# Patient Record
Sex: MALE
[De-identification: names, ages, dates, MRNs, and addresses within clinical notes are randomized per-mention and may not be internally consistent; named-entity substitution may affect disease eponyms.]

---

## 2017-05-03 PROBLEM — Z00.00 ENCOUNTER FOR PREVENTIVE HEALTH EXAMINATION: Status: ACTIVE | Noted: 2017-05-03

## 2020-12-22 ENCOUNTER — APPOINTMENT (OUTPATIENT)
Dept: OTOLARYNGOLOGY | Facility: CLINIC | Age: 65
End: 2020-12-22

## 2021-01-25 ENCOUNTER — APPOINTMENT (OUTPATIENT)
Dept: NEUROLOGY | Facility: CLINIC | Age: 66
End: 2021-01-25
Payer: COMMERCIAL

## 2021-01-25 VITALS — TEMPERATURE: 95.9 F

## 2021-01-25 VITALS
BODY MASS INDEX: 29.62 KG/M2 | WEIGHT: 200 LBS | DIASTOLIC BLOOD PRESSURE: 83 MMHG | HEIGHT: 69 IN | HEART RATE: 84 BPM | SYSTOLIC BLOOD PRESSURE: 120 MMHG

## 2021-01-25 DIAGNOSIS — H81.10 BENIGN PAROXYSMAL VERTIGO, UNSPECIFIED EAR: ICD-10-CM

## 2021-01-25 DIAGNOSIS — R20.0 ANESTHESIA OF SKIN: ICD-10-CM

## 2021-01-25 DIAGNOSIS — Z78.9 OTHER SPECIFIED HEALTH STATUS: ICD-10-CM

## 2021-01-25 DIAGNOSIS — Z87.891 PERSONAL HISTORY OF NICOTINE DEPENDENCE: ICD-10-CM

## 2021-01-25 DIAGNOSIS — Z56.0 UNEMPLOYMENT, UNSPECIFIED: ICD-10-CM

## 2021-01-25 DIAGNOSIS — H93.11 TINNITUS, RIGHT EAR: ICD-10-CM

## 2021-01-25 DIAGNOSIS — H91.93 UNSPECIFIED HEARING LOSS, BILATERAL: ICD-10-CM

## 2021-01-25 PROCEDURE — 99203 OFFICE O/P NEW LOW 30 MIN: CPT

## 2021-01-25 PROCEDURE — 99072 ADDL SUPL MATRL&STAF TM PHE: CPT

## 2021-01-25 RX ORDER — ATORVASTATIN CALCIUM 80 MG/1
TABLET, FILM COATED ORAL
Refills: 0 | Status: ACTIVE | COMMUNITY

## 2021-01-25 RX ORDER — METFORMIN HYDROCHLORIDE 625 MG/1
TABLET ORAL
Refills: 0 | Status: ACTIVE | COMMUNITY

## 2021-01-25 RX ORDER — IRBESARTAN 300 MG/1
TABLET ORAL
Refills: 0 | Status: ACTIVE | COMMUNITY

## 2021-01-25 RX ORDER — HYDROCHLOROTHIAZIDE 12.5 MG/1
TABLET ORAL
Refills: 0 | Status: ACTIVE | COMMUNITY

## 2021-01-25 SDOH — ECONOMIC STABILITY - INCOME SECURITY: UNEMPLOYMENT, UNSPECIFIED: Z56.0

## 2021-01-25 NOTE — CONSULT LETTER
[Dear  ___] : Dear  [unfilled], [Consult Letter:] : I had the pleasure of evaluating your patient, [unfilled]. [Please see my note below.] : Please see my note below. [Consult Closing:] : Thank you very much for allowing me to participate in the care of this patient.  If you have any questions, please do not hesitate to contact me. [Sincerely,] : Sincerely, [FreeTextEntry3] : Naseem Holt MD\par

## 2021-01-25 NOTE — REVIEW OF SYSTEMS
[Hand Weakness] :  hand weakness [Numbness] : numbness [Tingling] : tingling [Difficulty Walking] : difficulty walking [Loss Of Hearing] : hearing loss [Negative] : Heme/Lymph

## 2021-01-25 NOTE — HISTORY OF PRESENT ILLNESS
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  He describes episodic imbalance for 15 or 20 years.  In December 2020, he experienced positionally aggravated vertigo which subsided after a week.  He was evaluated in your office and was diagnosed with BPPV.  He has bilateral hearing loss and is being fitted for hearing aids.  He complains of right-sided tinnitus.  His most recent MRI of the brain was performed 15 years ago.\par \par His wife is concerned that he might suffer from Parkinson's disease.  For less than a year, he coughs when eating solids.  He denies dysarthria.\par \par He has been awakening from sleep over the past 6 months with numbness of his right more than left fingers.  All fingers are involved.  He complains that he is dropping objects.  He denies neck or back pain.\par

## 2021-01-25 NOTE — PHYSICAL EXAM
[FreeTextEntry1] : Constitutional:  Patient was well-developed, well-nourished and in no acute distress. \par \par Head:  Normocephalic, atraumatic. Tympanic membranes were clear. \par \par Neck:  Supple with full range of motion. \par \par Cardiovascular:  Cardiac rhythm was regular without murmur. There were no carotid bruits. Peripheral pulses were full and symmetric. \par \par Respiratory:  Lungs were clear. \par \par Abdomen:  Soft and nontender. \par \par Spine:  Nontender. \par \par Skin:  There were no rashes. \par \par NEUROLOGICAL EXAMINATION:\par \par Mental Status: Patient was alert and oriented. Speech was fluent. There was no dysarthria. \par \par Cranial Nerves: \par \par II: Visual acuity was 20/70-1 in the right eye and 20/25 in the left eye without correction . Pupils were equal and reactive. Visual fields were full. Funduscopic examination was normal. \par \par III, IV, VI:  Eye movements were full without nystagmus. \par \par V: Facial sensation was intact. \par \par VII: Facial strength was normal. \par \par VIII: Hearing was equal. \par \par IX, X: Palatal movement was normal. Phonation was normal. \par \par XI: Sternocleidomastoids and trapezii were normal. \par \par XII: Tongue was midline and movements normal. There was no lingual atrophy or fasciculations. \par \par Motor Examination: Muscle bulk, tone and strength were normal. \par \par Sensory Examination: Pinprick, vibration and joint position sense were intact.  There were no Tinel signs at the wrists or elbows.\par \par Reflexes: DTRs were 1 at the biceps and knees and absent elsewhere.\par \par Plantar Responses: Plantar responses were flexor. \par \par Coordination/Cerebellar Function: There was no dysmetria on finger to nose or heel to shin testing. \par \par Gait/Stance: Gait and tandem were normal. Romberg was negative.\par

## 2021-01-25 NOTE — ASSESSMENT
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  I suspect that these symptoms are the consequence of vestibular and cochlear dysfunction.  In addition, he has mild vague swallowing difficulties.  I suspect that his nocturnal hand symptoms are due to entrapment neuropathies, probably carpal tunnel syndrome.  He does not appear clinically parkinsonian.\par \par I suggested that he undergo an MRI of the brain and internal auditory canals with and without contrast.  He will return to the office for EMG and nerve conduction studies.  Further management will depend upon these results and his clinical course.

## 2021-02-11 ENCOUNTER — APPOINTMENT (OUTPATIENT)
Dept: NEUROLOGY | Facility: CLINIC | Age: 66
End: 2021-02-11
Payer: COMMERCIAL

## 2021-02-11 VITALS — TEMPERATURE: 97.3 F

## 2021-02-11 PROCEDURE — 99072 ADDL SUPL MATRL&STAF TM PHE: CPT

## 2021-02-11 PROCEDURE — 95886 MUSC TEST DONE W/N TEST COMP: CPT

## 2021-02-11 PROCEDURE — 95911 NRV CNDJ TEST 9-10 STUDIES: CPT

## 2021-02-11 NOTE — PROCEDURE
[FreeTextEntry1] : Nerve conduction studies and electromyography [FreeTextEntry2] : Right greater than left hand numbness and discomfort [FreeTextEntry3] : Electrodiagnostic testing was performed in the upper extremities.\par \par The median sensory nerves were nonreactive.  The ulnar sensory potentials were mildly low in amplitude but conduction velocities were normal.  Radial sensory potentials and conduction velocities were normal.\par \par The median motor distal latencies were prolonged, right worse than left but compound muscle action potentials were normal.  Right median conduction velocity was mildly slow and left was normal.  The ulnar motor distal latencies and compound muscle action potentials recorded at the abductor digiti minimi were normal.  There was slowing of ulnar conduction across the elbow segments.\par \par The median F waves were prolonged, right worse than left.  The ulnar F waves were normal.\par \par Needle electromyography was performed in several bilateral upper extremity and cervical paraspinal muscles.  There was no spontaneous activity noted in any muscle tested.  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  The median motor injuries were entirely demyelinating.\par \par There was electrophysiologic evidence of bilateral ulnar neuropathies in the cubital tunnels.  There was mild ulnar sensory axon injury bilaterally but no evidence of ulnar motor axon injury.\par \par There was no electrophysiologic evidence of right or left cervical radiculopathy, brachial plexopathy or sensorimotor polyneuropathy.

## 2021-02-22 ENCOUNTER — NON-APPOINTMENT (OUTPATIENT)
Age: 66
End: 2021-02-22

## 2021-03-17 ENCOUNTER — APPOINTMENT (OUTPATIENT)
Dept: NEUROLOGY | Facility: CLINIC | Age: 66
End: 2021-03-17

## 2021-06-14 ENCOUNTER — APPOINTMENT (OUTPATIENT)
Dept: NEUROLOGY | Facility: CLINIC | Age: 66
End: 2021-06-14
Payer: COMMERCIAL

## 2021-06-14 VITALS
WEIGHT: 200 LBS | HEIGHT: 69 IN | DIASTOLIC BLOOD PRESSURE: 74 MMHG | HEART RATE: 91 BPM | SYSTOLIC BLOOD PRESSURE: 109 MMHG | BODY MASS INDEX: 29.62 KG/M2

## 2021-06-14 DIAGNOSIS — G56.22 LESION OF ULNAR NERVE, LEFT UPPER LIMB: ICD-10-CM

## 2021-06-14 DIAGNOSIS — G56.12 OTHER LESIONS OF MEDIAN NERVE, LEFT UPPER LIMB: ICD-10-CM

## 2021-06-14 DIAGNOSIS — G56.21 LESION OF ULNAR NERVE, RIGHT UPPER LIMB: ICD-10-CM

## 2021-06-14 DIAGNOSIS — G56.11 OTHER LESIONS OF MEDIAN NERVE, RIGHT UPPER LIMB: ICD-10-CM

## 2021-06-14 PROCEDURE — 99213 OFFICE O/P EST LOW 20 MIN: CPT

## 2021-06-14 PROCEDURE — 99072 ADDL SUPL MATRL&STAF TM PHE: CPT

## 2021-06-14 RX ORDER — ALFUZOSIN HYDROCHLORIDE 10 MG/1
10 TABLET, EXTENDED RELEASE ORAL
Qty: 30 | Refills: 0 | Status: ACTIVE | COMMUNITY
Start: 2021-03-30

## 2021-06-14 RX ORDER — MELOXICAM 15 MG/1
15 TABLET ORAL
Qty: 30 | Refills: 0 | Status: ACTIVE | COMMUNITY
Start: 2021-03-02

## 2021-06-14 RX ORDER — METFORMIN ER 500 MG 500 MG/1
500 TABLET ORAL
Qty: 30 | Refills: 0 | Status: ACTIVE | COMMUNITY
Start: 2021-03-30

## 2021-06-14 RX ORDER — IRBESARTAN AND HYDROCHLOROTHIAZIDE 150; 12.5 MG/1; MG/1
150-12.5 TABLET ORAL
Qty: 30 | Refills: 0 | Status: ACTIVE | COMMUNITY
Start: 2021-03-30

## 2021-06-14 NOTE — HISTORY OF PRESENT ILLNESS
[FreeTextEntry1] : Dr. Holedn Monroy returned to the office having been last seen on February 11, 2021.  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  \par \par MRI brain revealed moderate chronic white matter changes.  Electrodiagnostic testing revealed median and ulnar neuropathies at the wrist and elbows respectively.  There was no evidence of cervical radiculopathy, brachial plexopathy or sensorimotor polyneuropathy.\par \par He was evaluated by Dr. Park Burnett, a hand surgeon.  She prescribed wrist extension splints and physical therapy.  Steroids were injected into both wrists.  He has noted mild improvement in his left wrist symptoms but not the right.  He denies any further positional vertigo but feels mildly imbalanced.

## 2021-06-14 NOTE — ASSESSMENT
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  MRI of the brain revealed chronic white matter changes.  I suggested close control of his cardiovascular risk factors.  A screening carotid Doppler will be ordered.  \par \par A musculoskeletal ultrasound of his upper extremities will be requested to assess his entrapment neuropathies.  He will continue use of wrist extension splints and nonsteroidal anti-inflammatory agents.  Further management will depend upon these results and his clinical course.

## 2021-06-14 NOTE — PHYSICAL EXAM
[FreeTextEntry1] : Constitutional:  Patient was well-developed, well-nourished and in no acute distress. \par \par Head:  Normocephalic, atraumatic. Tympanic membranes were clear. \par \par Neck:  Supple with full range of motion. \par \par Cardiovascular:  Cardiac rhythm was regular without murmur. There were no carotid bruits. Peripheral pulses were full and symmetric. \par \par Respiratory:  Lungs were clear. \par \par Abdomen:  Soft and nontender. \par \par Spine:  Nontender. \par \par Skin:  There were no rashes. \par \par NEUROLOGICAL EXAMINATION:\par \par Mental Status: Patient was alert and oriented. Speech was fluent. There was no dysarthria. \par \par Cranial Nerves: \par \par II: Visual acuity was 20/70 in the right eye and 20/20 in the left eye without correction . Pupils were equal and reactive. Visual fields were full. Funduscopic examination was normal. \par \par III, IV, VI:  Eye movements were full without nystagmus. \par \par V: Facial sensation was intact. \par \par VII: Facial strength was normal. \par \par VIII: Hearing was equal. \par \par IX, X: Palatal movement was normal. Phonation was normal. \par \par XI: Sternocleidomastoids and trapezii were normal. \par \par XII: Tongue was midline and movements normal. There was no lingual atrophy or fasciculations. \par \par Motor Examination: Muscle bulk, tone and strength were normal except for headache of right thumb abduction weakness.\par \par Sensory Examination: Pinprick, vibration and joint position sense were intact.  There were no Tinel signs at the wrists or elbows.\par \par Reflexes: DTRs were 1 at the biceps and knees and absent elsewhere.\par \par Plantar Responses: Plantar responses were flexor. \par \par Coordination/Cerebellar Function: There was no dysmetria on finger to nose or heel to shin testing. \par \par Gait/Stance: Gait was normal. Romberg was negative.  Tandem was mildly unsteady.\par

## 2021-06-14 NOTE — CONSULT LETTER
[Dear  ___] : Dear  [unfilled], [Consult Letter:] : I had the pleasure of evaluating your patient, [unfilled]. [Please see my note below.] : Please see my note below. [Consult Closing:] : Thank you very much for allowing me to participate in the care of this patient.  If you have any questions, please do not hesitate to contact me. [Sincerely,] : Sincerely, [FreeTextEntry3] : Naseem Holt MD\par  [DrMunir  ___] : Dr. COPELAND

## 2021-10-25 ENCOUNTER — APPOINTMENT (OUTPATIENT)
Dept: NEUROLOGY | Facility: CLINIC | Age: 66
End: 2021-10-25

## 2022-10-25 ENCOUNTER — TRANSCRIPTION ENCOUNTER (OUTPATIENT)
Age: 67
End: 2022-10-25

## 2023-08-03 ENCOUNTER — NON-APPOINTMENT (OUTPATIENT)
Age: 68
End: 2023-08-03

## 2024-02-09 ENCOUNTER — APPOINTMENT (OUTPATIENT)
Dept: ENDOCRINOLOGY | Facility: CLINIC | Age: 69
End: 2024-02-09
Payer: COMMERCIAL

## 2024-02-09 VITALS
OXYGEN SATURATION: 97 % | WEIGHT: 195 LBS | DIASTOLIC BLOOD PRESSURE: 64 MMHG | BODY MASS INDEX: 28.8 KG/M2 | SYSTOLIC BLOOD PRESSURE: 110 MMHG | HEART RATE: 77 BPM

## 2024-02-09 DIAGNOSIS — E78.5 HYPERLIPIDEMIA, UNSPECIFIED: ICD-10-CM

## 2024-02-09 DIAGNOSIS — I10 ESSENTIAL (PRIMARY) HYPERTENSION: ICD-10-CM

## 2024-02-09 DIAGNOSIS — E11.9 TYPE 2 DIABETES MELLITUS W/OUT COMPLICATIONS: ICD-10-CM

## 2024-02-09 LAB — GLUCOSE BLDC GLUCOMTR-MCNC: 118

## 2024-02-09 PROCEDURE — 82962 GLUCOSE BLOOD TEST: CPT

## 2024-02-09 PROCEDURE — 99205 OFFICE O/P NEW HI 60 MIN: CPT

## 2024-02-09 RX ORDER — SITAGLIPTIN AND METFORMIN HYDROCHLORIDE 100; 1000 MG/1; MG/1
100-1000 TABLET, FILM COATED, EXTENDED RELEASE ORAL DAILY
Qty: 1 | Refills: 2 | Status: ACTIVE | COMMUNITY
Start: 2024-02-09 | End: 1900-01-01

## 2024-02-09 NOTE — ASSESSMENT
[Diabetes Foot Care] : diabetes foot care [Long Term Vascular Complications] : long term vascular complications of diabetes [Carbohydrate Consistent Diet] : carbohydrate consistent diet [Importance of Diet and Exercise] : importance of diet and exercise to improve glycemic control, achieve weight loss and improve cardiovascular health [Exercise/Effect on Glucose] : exercise/effect on glucose [Hypoglycemia Management] : hypoglycemia management [Glucagon Use] : glucagon use [Self Monitoring of Blood Glucose] : self monitoring of blood glucose [Retinopathy Screening] : Patient was referred to ophthalmology for retinopathy screening [Diabetic Medications] : Risks and benefits of diabetic medications were discussed [FreeTextEntry1] : T2DM Current approaches to a diabetes management are discussed with the patient.  Target ranges for blood sugar, blood pressure and cholesterol reviewed, and risk reduction strategies verified. Hypoglycemia precautions reviewed with the patient.  Suggested extensive diabetes education program, including nutritional and diabetes teaching and evaluation. Proper dietary restrictions and exercise routines discussed.  Glucometer/SMBG and log book charting discussed. Routine follow-ups with a podiatrist and an ophthalmologist. - d/c metformin - Janumet /1000 mg qd - Lipitor 20 mg HS - Avapro/-12.5 mg qd - start CGM (patient will inquire about coverage) - if needs better control/ weight loss- will switch to [id number] next visit - urology and ID follow up  RTC 3 months

## 2024-02-09 NOTE — REVIEW OF SYSTEMS
[TextEntry] : Constitutional: Denies excessive fatigue. Denies significant weight changes  Skin: Denies diaphoresis or sweating  HEENT: Denies vision changes, eye protrusion/dryness Endocrine: No cold intolerance. No heat intolerance  CV: Denies chest pain, palpitations or sensation of heart racing  Pulmonary: No SOB GI: Denies diarrhea or constipation, nausea/ vomiting  Neurological: Denies numbness, paresthesias, tremor  Neuropsychiatric: Denies anxiety  Musculoskeletal: Denies new joint pain, muscle aches or proximal muscle weakness. No intermittent claudication

## 2024-02-09 NOTE — CONSULT LETTER
[Dear  ___] : Dear  [unfilled], [Sincerely,] : Sincerely, [FreeTextEntry1] : Thank you for referring  Mr. JG MCCARHTY to me for evaluation and treatment. Please, see attached consultation note. As always, if there are specific questions you would like to discuss, please feel free to contact me. Thank you for the courtesy of this evaluation. [FreeTextEntry3] : Liam Abad MD, FACE, [facility]

## 2024-02-09 NOTE — HISTORY OF PRESENT ILLNESS
[FreeTextEntry1] : HISTORY OF PRESENT ILLNESS.   Mr. MCCARTHY was diagnosed with Diabetes Mellitus Type 2 about 10 years ago. He reports history HTN, dyslipidemia, and denies CAD,  known complications of retinopathy, nephropathy, or neuropathy. He is presently on Metformin  mg qd, Avapro-HVCT- 150-12.5 mg qd, Lipitor 20 mg HS. He reports recent MRSA UTI's, for which he's been treated with Bactrim. He's planning to see a urologist. Blood sugars are not checked at home.  Fingerstick glucose in the office today is  118 mg/dL 2 hours after eating.  Diet: not following ADA, but cut down on CHO past several weeks, and was able to lose about 10 lbs Exercise: past 2 weeks increased activity  Lab review: a1c- 6.6 <-- 6.4 <-- 6.1, creatinine- 0.87, LDL- 74, TSH- 0.8, negative microalbumin  Last dilated eye - several years ago Last podiatry visit  - 01/24 Last cardiology evaluation - 08/23 Last stress test - 08/23 Last 2-D Echo - 08/23 Last nephrology evaluation - none Last neurology evaluation-6/23

## 2024-02-09 NOTE — PHYSICAL EXAM
[TextEntry] : General: In no acute distress. Head: Normocephalic Skin: Normal, no bruises. There are no cushingoid features. Eyes: No pallor, lid lag or orbitopathy. Neck: No audible carotid bruit Thyroid: palpable, non-tender. No audible bruit. Not enlarged. No palpable nodules. [place]'s negative Lymph nodes: no palpable neck lymphadenopathy. Chest: Lungs clear to auscultation Heart: S1, S2, no murmurs Abdomen: No tenderness, no masses, no striae Neurological: Deep tendon reflexes symmetrical, 2+ (biceps, brachioradialis, patellar). Lower Extremities: No edema, no cyanosis Foot exam: No ulcers, no onychomycotic nail changes. Good pedal pulses. 10-g [place] M monofilament testing is normal bilaterally. Vibratory sensation with 128-Hz tuning fork is preserved bilaterally.

## 2024-02-11 PROBLEM — E78.5 HYPERLIPIDEMIA: Status: ACTIVE | Noted: 2021-01-25

## 2024-02-11 PROBLEM — I10 HYPERTENSION: Status: ACTIVE | Noted: 2021-01-25

## 2024-02-12 RX ORDER — BLOOD-GLUCOSE,RECEIVER,CONT
EACH MISCELLANEOUS
Qty: 1 | Refills: 0 | Status: DISCONTINUED | COMMUNITY
Start: 2024-02-09 | End: 2024-02-12

## 2024-02-12 RX ORDER — BLOOD-GLUCOSE SENSOR
EACH MISCELLANEOUS
Qty: 9 | Refills: 3 | Status: DISCONTINUED | COMMUNITY
Start: 2024-02-09 | End: 2024-02-12

## 2024-02-12 RX ORDER — BLOOD-GLUCOSE SENSOR
EACH MISCELLANEOUS
Qty: 6 | Refills: 2 | Status: ACTIVE | COMMUNITY
Start: 2024-02-12 | End: 1900-01-01

## 2024-02-12 RX ORDER — BLOOD-GLUCOSE,RECEIVER,CONT
EACH MISCELLANEOUS
Qty: 1 | Refills: 0 | Status: ACTIVE | COMMUNITY
Start: 2024-02-12 | End: 1900-01-01

## 2024-02-20 ENCOUNTER — RESULT REVIEW (OUTPATIENT)
Age: 69
End: 2024-02-20

## 2024-02-20 ENCOUNTER — APPOINTMENT (OUTPATIENT)
Dept: UROLOGY | Facility: CLINIC | Age: 69
End: 2024-02-20
Payer: COMMERCIAL

## 2024-02-20 VITALS
RESPIRATION RATE: 16 BRPM | SYSTOLIC BLOOD PRESSURE: 120 MMHG | OXYGEN SATURATION: 98 % | HEART RATE: 89 BPM | DIASTOLIC BLOOD PRESSURE: 69 MMHG | TEMPERATURE: 97.5 F

## 2024-02-20 DIAGNOSIS — R31.9 URINARY TRACT INFECTION, SITE NOT SPECIFIED: ICD-10-CM

## 2024-02-20 DIAGNOSIS — N39.0 URINARY TRACT INFECTION, SITE NOT SPECIFIED: ICD-10-CM

## 2024-02-20 DIAGNOSIS — R31.0 GROSS HEMATURIA: ICD-10-CM

## 2024-02-20 PROCEDURE — 99204 OFFICE O/P NEW MOD 45 MIN: CPT

## 2024-02-20 RX ORDER — ALPRAZOLAM 0.5 MG/1
0.5 TABLET ORAL
Qty: 4 | Refills: 0 | Status: ACTIVE | COMMUNITY
Start: 2024-02-20 | End: 1900-01-01

## 2024-02-21 LAB
APPEARANCE: CLEAR
BACTERIA UR CULT: NORMAL
BACTERIA: NEGATIVE /HPF
BILIRUBIN URINE: NEGATIVE
BLOOD URINE: NEGATIVE
CAST: 0 /LPF
COLOR: YELLOW
EPITHELIAL CELLS: 0 /HPF
GLUCOSE QUALITATIVE U: NEGATIVE MG/DL
KETONES URINE: NEGATIVE MG/DL
LEUKOCYTE ESTERASE URINE: NEGATIVE
MICROSCOPIC-UA: NORMAL
NITRITE URINE: NEGATIVE
PH URINE: 6
PROTEIN URINE: NEGATIVE MG/DL
RED BLOOD CELLS URINE: 0 /HPF
SPECIFIC GRAVITY URINE: 1.01
UROBILINOGEN URINE: 0.2 MG/DL
WHITE BLOOD CELLS URINE: 0 /HPF

## 2024-02-22 NOTE — ASSESSMENT
[FreeTextEntry1] : We discussed sources of MRSA in the urine which is unusual . Some reasons could be urinary retention ,stones , lesion , prostate abcess  We will do CTU to r/o stones and lesions . We will do prostate MRI to r/o prostate abscess  He will follow up with Dr Alexander for cysto if all imaging is normal. May need to see ID  He also has a nasal swab for MRSA pending [Urinary Tract Infection (599.0\N39.0)] : qualitative ~C was positive

## 2024-02-22 NOTE — HISTORY OF PRESENT ILLNESS
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 He developed dysuria and frequency and gross hematuria Sent off a culture and started himself on Bactrim before culture returned . Culture was MRSA  He completed a course of antibiotics and did well for a few weeks and his symptoms returned . Again MRSA and took another 7 days of antibiotics  Currently his culture is negative  He is  here to find source of MRSA

## 2024-02-22 NOTE — PHYSICAL EXAM
[Normal Appearance] : normal appearance [Edema] : no peripheral edema [Well Groomed] : well groomed [General Appearance - In No Acute Distress] : no acute distress [Respiration, Rhythm And Depth] : normal respiratory rhythm and effort [Exaggerated Use Of Accessory Muscles For Inspiration] : no accessory muscle use [Abdomen Tenderness] : non-tender [Abdomen Soft] : soft [Costovertebral Angle Tenderness] : no ~M costovertebral angle tenderness [Normal Station and Gait] : the gait and station were normal for the patient's age [Urinary Bladder Findings] : the bladder was normal on palpation [] : no rash [Oriented To Time, Place, And Person] : oriented to person, place, and time [No Focal Deficits] : no focal deficits [Mood] : the mood was normal [Affect] : the affect was normal [No Palpable Adenopathy] : no palpable adenopathy

## 2024-02-23 ENCOUNTER — APPOINTMENT (OUTPATIENT)
Dept: CT IMAGING | Facility: HOSPITAL | Age: 69
End: 2024-02-23
Payer: COMMERCIAL

## 2024-02-23 ENCOUNTER — OUTPATIENT (OUTPATIENT)
Dept: OUTPATIENT SERVICES | Facility: HOSPITAL | Age: 69
LOS: 1 days | End: 2024-02-23
Payer: COMMERCIAL

## 2024-02-23 DIAGNOSIS — R31.0 GROSS HEMATURIA: ICD-10-CM

## 2024-02-23 PROCEDURE — 74178 CT ABD&PLV WO CNTR FLWD CNTR: CPT | Mod: 26

## 2024-02-23 PROCEDURE — 74178 CT ABD&PLV WO CNTR FLWD CNTR: CPT

## 2024-02-27 ENCOUNTER — APPOINTMENT (OUTPATIENT)
Dept: MRI IMAGING | Facility: CLINIC | Age: 69
End: 2024-02-27
Payer: COMMERCIAL

## 2024-02-27 ENCOUNTER — RESULT REVIEW (OUTPATIENT)
Age: 69
End: 2024-02-27

## 2024-02-27 ENCOUNTER — OUTPATIENT (OUTPATIENT)
Dept: OUTPATIENT SERVICES | Facility: HOSPITAL | Age: 69
LOS: 1 days | End: 2024-02-27
Payer: COMMERCIAL

## 2024-02-27 DIAGNOSIS — R31.0 GROSS HEMATURIA: ICD-10-CM

## 2024-02-27 PROCEDURE — A9585: CPT

## 2024-02-27 PROCEDURE — 76498 UNLISTED MR PROCEDURE: CPT

## 2024-02-27 PROCEDURE — 76498P: CUSTOM | Mod: 26

## 2024-02-27 PROCEDURE — 72197 MRI PELVIS W/O & W/DYE: CPT | Mod: 26

## 2024-02-27 PROCEDURE — 72197 MRI PELVIS W/O & W/DYE: CPT

## 2024-03-12 ENCOUNTER — OUTPATIENT (OUTPATIENT)
Dept: OUTPATIENT SERVICES | Facility: HOSPITAL | Age: 69
LOS: 1 days | End: 2024-03-12
Payer: COMMERCIAL

## 2024-03-12 ENCOUNTER — APPOINTMENT (OUTPATIENT)
Dept: UROLOGY | Facility: CLINIC | Age: 69
End: 2024-03-12
Payer: COMMERCIAL

## 2024-03-12 DIAGNOSIS — N40.1 BENIGN PROSTATIC HYPERPLASIA WITH LOWER URINARY TRACT SYMPMS: ICD-10-CM

## 2024-03-12 DIAGNOSIS — N39.0 URINARY TRACT INFECTION, SITE NOT SPECIFIED: ICD-10-CM

## 2024-03-12 DIAGNOSIS — R35.0 FREQUENCY OF MICTURITION: ICD-10-CM

## 2024-03-12 PROCEDURE — G2211 COMPLEX E/M VISIT ADD ON: CPT | Mod: NC,1L

## 2024-03-12 PROCEDURE — 52310 CYSTOSCOPY AND TREATMENT: CPT

## 2024-03-12 RX ORDER — SULFAMETHOXAZOLE AND TRIMETHOPRIM 800; 160 MG/1; MG/1
800-160 TABLET ORAL
Qty: 90 | Refills: 0 | Status: ACTIVE | COMMUNITY
Start: 2024-03-12 | End: 1900-01-01

## 2024-03-12 RX ORDER — FINASTERIDE 5 MG/1
5 TABLET, FILM COATED ORAL DAILY
Qty: 90 | Refills: 3 | Status: ACTIVE | COMMUNITY
Start: 2024-03-12 | End: 1900-01-01

## 2024-03-13 DIAGNOSIS — N40.1 BENIGN PROSTATIC HYPERPLASIA WITH LOWER URINARY TRACT SYMPTOMS: ICD-10-CM

## 2024-03-13 DIAGNOSIS — N39.0 URINARY TRACT INFECTION, SITE NOT SPECIFIED: ICD-10-CM

## 2024-03-13 LAB — BACTERIA UR CULT: NORMAL

## 2024-04-30 ENCOUNTER — APPOINTMENT (OUTPATIENT)
Dept: UROLOGY | Facility: CLINIC | Age: 69
End: 2024-04-30

## 2024-10-25 ENCOUNTER — TRANSCRIPTION ENCOUNTER (OUTPATIENT)
Age: 69
End: 2024-10-25

## 2024-10-28 ENCOUNTER — TRANSCRIPTION ENCOUNTER (OUTPATIENT)
Age: 69
End: 2024-10-28

## 2024-11-07 ENCOUNTER — TRANSCRIPTION ENCOUNTER (OUTPATIENT)
Age: 69
End: 2024-11-07